# Patient Record
Sex: MALE | Race: OTHER | NOT HISPANIC OR LATINO | URBAN - METROPOLITAN AREA
[De-identification: names, ages, dates, MRNs, and addresses within clinical notes are randomized per-mention and may not be internally consistent; named-entity substitution may affect disease eponyms.]

---

## 2024-02-22 ENCOUNTER — EMERGENCY (EMERGENCY)
Facility: HOSPITAL | Age: 34
LOS: 1 days | Discharge: HOME | End: 2024-02-22
Attending: EMERGENCY MEDICINE | Admitting: EMERGENCY MEDICINE
Payer: SELF-PAY

## 2024-02-22 VITALS
TEMPERATURE: 98 F | RESPIRATION RATE: 16 BRPM | SYSTOLIC BLOOD PRESSURE: 145 MMHG | OXYGEN SATURATION: 97 % | DIASTOLIC BLOOD PRESSURE: 80 MMHG | HEART RATE: 120 BPM

## 2024-02-22 DIAGNOSIS — R00.0 TACHYCARDIA, UNSPECIFIED: ICD-10-CM

## 2024-02-22 DIAGNOSIS — F12.929 CANNABIS USE, UNSPECIFIED WITH INTOXICATION, UNSPECIFIED: ICD-10-CM

## 2024-02-22 DIAGNOSIS — R94.31 ABNORMAL ELECTROCARDIOGRAM [ECG] [EKG]: ICD-10-CM

## 2024-02-22 DIAGNOSIS — R41.82 ALTERED MENTAL STATUS, UNSPECIFIED: ICD-10-CM

## 2024-02-22 LAB
ALBUMIN SERPL ELPH-MCNC: 4 G/DL — SIGNIFICANT CHANGE UP (ref 3.4–5)
ALP SERPL-CCNC: 55 U/L — SIGNIFICANT CHANGE UP (ref 40–120)
ALT FLD-CCNC: 29 U/L — SIGNIFICANT CHANGE UP (ref 12–42)
ANION GAP SERPL CALC-SCNC: 13 MMOL/L — SIGNIFICANT CHANGE UP (ref 9–16)
AST SERPL-CCNC: 23 U/L — SIGNIFICANT CHANGE UP (ref 15–37)
BASOPHILS # BLD AUTO: 0.05 K/UL — SIGNIFICANT CHANGE UP (ref 0–0.2)
BASOPHILS NFR BLD AUTO: 0.4 % — SIGNIFICANT CHANGE UP (ref 0–2)
BILIRUB SERPL-MCNC: 0.4 MG/DL — SIGNIFICANT CHANGE UP (ref 0.2–1.2)
BUN SERPL-MCNC: 22 MG/DL — SIGNIFICANT CHANGE UP (ref 7–23)
CALCIUM SERPL-MCNC: 9.4 MG/DL — SIGNIFICANT CHANGE UP (ref 8.5–10.5)
CHLORIDE SERPL-SCNC: 102 MMOL/L — SIGNIFICANT CHANGE UP (ref 96–108)
CO2 SERPL-SCNC: 22 MMOL/L — SIGNIFICANT CHANGE UP (ref 22–31)
CREAT SERPL-MCNC: 1.26 MG/DL — SIGNIFICANT CHANGE UP (ref 0.5–1.3)
EGFR: 78 ML/MIN/1.73M2 — SIGNIFICANT CHANGE UP
EOSINOPHIL # BLD AUTO: 0.04 K/UL — SIGNIFICANT CHANGE UP (ref 0–0.5)
EOSINOPHIL NFR BLD AUTO: 0.3 % — SIGNIFICANT CHANGE UP (ref 0–6)
GLUCOSE SERPL-MCNC: 136 MG/DL — HIGH (ref 70–99)
HCT VFR BLD CALC: 44.2 % — SIGNIFICANT CHANGE UP (ref 39–50)
HGB BLD-MCNC: 15.2 G/DL — SIGNIFICANT CHANGE UP (ref 13–17)
IMM GRANULOCYTES NFR BLD AUTO: 0.4 % — SIGNIFICANT CHANGE UP (ref 0–0.9)
LACTATE BLDV-MCNC: 1.2 MMOL/L — SIGNIFICANT CHANGE UP (ref 0.5–2)
LYMPHOCYTES # BLD AUTO: 16.4 % — SIGNIFICANT CHANGE UP (ref 13–44)
LYMPHOCYTES # BLD AUTO: 2.26 K/UL — SIGNIFICANT CHANGE UP (ref 1–3.3)
MAGNESIUM SERPL-MCNC: 1.8 MG/DL — SIGNIFICANT CHANGE UP (ref 1.6–2.6)
MCHC RBC-ENTMCNC: 30.8 PG — SIGNIFICANT CHANGE UP (ref 27–34)
MCHC RBC-ENTMCNC: 34.4 GM/DL — SIGNIFICANT CHANGE UP (ref 32–36)
MCV RBC AUTO: 89.7 FL — SIGNIFICANT CHANGE UP (ref 80–100)
MONOCYTES # BLD AUTO: 1.17 K/UL — HIGH (ref 0–0.9)
MONOCYTES NFR BLD AUTO: 8.5 % — SIGNIFICANT CHANGE UP (ref 2–14)
NEUTROPHILS # BLD AUTO: 10.22 K/UL — HIGH (ref 1.8–7.4)
NEUTROPHILS NFR BLD AUTO: 74 % — SIGNIFICANT CHANGE UP (ref 43–77)
NRBC # BLD: 0 /100 WBCS — SIGNIFICANT CHANGE UP (ref 0–0)
PCO2 BLDV: 48 MMHG — SIGNIFICANT CHANGE UP (ref 42–55)
PH BLDV: 7.35 — SIGNIFICANT CHANGE UP (ref 7.32–7.43)
PLATELET # BLD AUTO: 338 K/UL — SIGNIFICANT CHANGE UP (ref 150–400)
PO2 BLDV: 55 MMHG — HIGH (ref 25–45)
POTASSIUM SERPL-MCNC: 3.6 MMOL/L — SIGNIFICANT CHANGE UP (ref 3.5–5.3)
POTASSIUM SERPL-SCNC: 3.6 MMOL/L — SIGNIFICANT CHANGE UP (ref 3.5–5.3)
PROT SERPL-MCNC: 7.5 G/DL — SIGNIFICANT CHANGE UP (ref 6.4–8.2)
RBC # BLD: 4.93 M/UL — SIGNIFICANT CHANGE UP (ref 4.2–5.8)
RBC # FLD: 12.8 % — SIGNIFICANT CHANGE UP (ref 10.3–14.5)
SAO2 % BLDV: 85.4 % — SIGNIFICANT CHANGE UP (ref 67–88)
SODIUM SERPL-SCNC: 137 MMOL/L — SIGNIFICANT CHANGE UP (ref 132–145)
WBC # BLD: 13.8 K/UL — HIGH (ref 3.8–10.5)
WBC # FLD AUTO: 13.8 K/UL — HIGH (ref 3.8–10.5)

## 2024-02-22 PROCEDURE — 99223 1ST HOSP IP/OBS HIGH 75: CPT

## 2024-02-22 RX ORDER — FAMOTIDINE 10 MG/ML
20 INJECTION INTRAVENOUS ONCE
Refills: 0 | Status: COMPLETED | OUTPATIENT
Start: 2024-02-22 | End: 2024-02-22

## 2024-02-22 RX ORDER — SODIUM CHLORIDE 9 MG/ML
1000 INJECTION INTRAMUSCULAR; INTRAVENOUS; SUBCUTANEOUS ONCE
Refills: 0 | Status: COMPLETED | OUTPATIENT
Start: 2024-02-22 | End: 2024-02-22

## 2024-02-22 RX ORDER — KETOROLAC TROMETHAMINE 30 MG/ML
15 SYRINGE (ML) INJECTION ONCE
Refills: 0 | Status: DISCONTINUED | OUTPATIENT
Start: 2024-02-22 | End: 2024-02-22

## 2024-02-22 RX ADMIN — FAMOTIDINE 20 MILLIGRAM(S): 10 INJECTION INTRAVENOUS at 21:04

## 2024-02-22 RX ADMIN — SODIUM CHLORIDE 1000 MILLILITER(S): 9 INJECTION INTRAMUSCULAR; INTRAVENOUS; SUBCUTANEOUS at 21:04

## 2024-02-22 RX ADMIN — Medication 15 MILLIGRAM(S): at 21:04

## 2024-02-22 RX ADMIN — Medication 1 MILLIGRAM(S): at 21:04

## 2024-02-22 NOTE — ED CDU PROVIDER INITIAL DAY NOTE - CLINICAL SUMMARY MEDICAL DECISION MAKING FREE TEXT BOX
no e/o trauma, Tachycardia common in cannabis intox, no e/o dangerous arrhythmia or ACS. Trending down. Antiemetics held for long QT. Pending clinical sobriety, anticipate d/c home.

## 2024-02-22 NOTE — ED ADULT NURSE NOTE - NSSUHOSCREENINGYN_ED_ALL_ED
TRANSITIONAL CARE MANAGEMENT - HOSPITAL DISCHARGE FOLLOW-UP    Contacted Mr. Cordova regarding follow-up for Chest pain, Chest pain, unspecified type, Chest pain after hospital discharge. He was discharged from the hospital on 4/30/2020. Review of the After Visit Summary from the recent hospitalization indicates that the patient needs to follow up with Dr. Huynh in 1 week.    He feels that he is doing very well at home.   His diet concern is none. Overall, the patient is eating well.   Ambulation: resolved  Fever: is not present  Pain: none  Activities of Daily Living (global): Self-care   Patient states that he does have sufficient family support. He feels that he is able to ask for assistance when needed.     Additional patient/family concerns: None .    Discharge medications were verified with the patient. He is fully compliant with the medication regimen prescribed at the time of discharge.    Upon discharge, the patient was to receive none.   Advance Directives:  not discussed    Patient has an appointment on May 8, 2020 with NDAEGE Maya. Mr. Cordova was reminded about the importance of keeping this appointment.      No - the patient is unable to be screened due to medical condition

## 2024-02-22 NOTE — ED CDU PROVIDER INITIAL DAY NOTE - OBJECTIVE STATEMENT
Pt BIB by EMS for cannabis intoxication.  Admits to eating a cannabis lollipop today, no other complaints.  Placed in stretcher and quickly falls asleep.  Unable to cooperate with remainder of history.

## 2024-02-22 NOTE — ED ADULT NURSE NOTE - OBJECTIVE STATEMENT
Patient is a 32 y/o M c/o altered mental status. Patient bibems for ams. patient admits to taking thc lolipop. Patient reports visiting from [place]. Patient denies falls or head trauma. Patient denies any other drug use. Patient actively vomitting upon assessment. Unable to give medications at this time due to previous QT prolongation on ekg prior. Patient placed on continuous cardiac monitor and pulse. Safety intact- all needs met at this time

## 2024-02-22 NOTE — ED PROVIDER NOTE - CLINICAL SUMMARY MEDICAL DECISION MAKING FREE TEXT BOX
no e/o trauma, Tachycardia common in cannabis intox, no e/o dangerous arrhythmia or ACS. Trending down. Pending clinical sobriety, anticipate d/c home. no e/o trauma, Tachycardia common in cannabis intox, no e/o dangerous arrhythmia or ACS. Trending down. Antiemetics held for long QT. Pending clinical sobriety, anticipate d/c home.

## 2024-02-22 NOTE — ED PROVIDER NOTE - PROGRESS NOTE DETAILS
Latasha–patient was endorsed to me pending improved mental status will place in observation and continue to monitor repeat EKG for prolongation of QTc and check basic electrolytes

## 2024-02-22 NOTE — ED ADULT TRIAGE NOTE - CHIEF COMPLAINT QUOTE
BIBEMS from [facility]. Pt endorses eating thc lollipop today. Pt nauseated on arrival. Denies any medical hx

## 2024-02-22 NOTE — ED PROVIDER NOTE - NSFOLLOWUPINSTRUCTIONS_ED_ALL_ED_FT
Preventing Marijuana Misuse, Adult  Marijuana is a mixture of the dried leaves and flowers of the hemp plant Cannabis sativa. The plant's active ingredients (cannabinoids) change the chemistry of the brain. If you smoke or eat marijuana, you will have changes in the way you think, feel, and behave.    In some cases, marijuana is prescribed by a health care provider (medical marijuana) for temporary relief of a medical condition. It can have medical effects, such as pain relief or reduced nausea. Many people use marijuana for recreational purposes because it helps them relax and puts them in a good mood (marijuana high).    How can marijuana use affect me?  Effects of marijuana are mental and physical. It can have negative effects, both short-term and long-term. When used for recreation, marijuana is often used in higher doses and for longer periods. High doses of marijuana can cause unpleasant side effects. It is also possible to become addicted to marijuana.    Short-term effects of marijuana use include:  Physical effects:  Increased heart rate.  Slowed movement, coordination, and reaction time. This can lead to accidents.  Red eyes and changes in vision.  Increased hunger.  Coughing.  Mental effects:  Changes in mood and perception, such as an altered sense of time.  Poor memory, decision-making, and problem-solving.  Being very sleepy.  High doses of marijuana can cause:  Panic.  Anxiety.  Confusion.  Severe dizziness.  Vomiting.  Hallucinations. This means you see, hear, taste, smell, or feel things that are not real.  What can increase my risk?  You may be at a higher risk of marijuana misuse if you:  Have a family history of drug addiction.  Often use other substances, like alcohol.  Began using marijuana at an early age.  Have had past issues with substance use disorder.  What can happen if I keep using marijuana?  If you use marijuana for a long time, it can have long-term effects such as:  Higher risk of health problems, including:  Lung and breathing problems.  Possible higher risk of heart and blood vessel problems.  Possible higher risk of lung or testicular cancer.  A decrease in the body's disease-fighting system (immune system).  Mental and physical dependence (addiction).  Hallucinations or periods of false perceptions or beliefs (paranoia).  Worsening of mental illness or onset of new mental illness. This can include anxiety, depression, or suicidal thoughts.  Trouble maintaining healthy relationships.  Poor memory and trouble concentrating and learning. This can result in lower intelligence and poor performance at school or work.  Higher risk of using other substances like alcohol, nicotine, and illegal drugs.  A condition called cannabinoid hyperemesis syndrome. This causes repeated episodes of intense abdominal pain, nausea, and vomiting.  Quitting marijuana after using it for a long time can cause withdrawal symptoms, such as headache, shakiness, and cravings for the drug.    What actions can I take to stop using marijuana?  A group of people participating in a support group.  If you are not physically or mentally dependent on marijuana, you should be able to stop using it on your own. Taking these actions may help:  Find healthy ways to cope with stress, such as exercise, meditation, or spending time with family and friends. Talk with your health care provider about how you feel and how to cope with stress.  Spend time with people who do not use marijuana. Or, make new friends who do not use marijuana.  Do something else instead of using marijuana. Exercise, start a new hobby, or take part in activities with others.  Do not be afraid to say no if someone offers you marijuana. Speak up about why you do not want to use drugs. You can be a positive role model for others.  If you cannot stop on your own, ask your health care provider for help. Treatment for marijuana misuse may include:  Cognitive-behavioral therapy (psychotherapy). This may include individual or group therapy.  Joining a support group.  Treating other medical, behavioral, or mental health conditions that you may have.  What are the effects of vaping?  Vaping devices, such as e-cigarettes, may be used to smoke marijuana products. Vaping involves inhaling an aerosol or vapor made from a liquid or dry substance that is heated in the device.    People who vape may also use marijuana concentrates. Vaping these products can result in more side effects than the use of plant marijuana, such as:  Inhaling toxic substances, including metals and volatile organic compounds (VOCs) from the devices and solvents used.  Inhaling heated air. This has been shown to burn lung tissue and cause lung diseases and lung cancer.  Increased risk of mental illness, such as psychosis and schizophrenia.  Where to find more information  Centers for Disease Control and Prevention (CDC): cdc.gov  National Institutes of Health: nccih.nih.gov  Substance Abuse and Mental Health Services Administration: samhsa.gov  Contact a health care provider if:  You want to stop using marijuana but you cannot.  You have withdrawal symptoms when you try to stop using marijuana.  You are using marijuana every day or with other drugs.  You need to use more and more marijuana to get the same desired effect.  You have anxiety or depression.  You have hallucinations or paranoia.  Marijuana use is interfering with your relationships or your ability to function at school or work.  You have severe stomach pain, nausea, and vomiting.  Get help right away if:  You have chest pain or shortness of breath.  You have fast or irregular heartbeats (palpitations).  These symptoms may be an emergency. Get help right away. Call 911.  Do not wait to see if the symptoms will go away.  Do not drive yourself to the hospital.  This information is not intended to replace advice given to you by your health care provider. Make sure you discuss any questions you have with your health care provider.

## 2024-02-22 NOTE — ED CDU PROVIDER INITIAL DAY NOTE - PROGRESS NOTE DETAILS
Patient is now awake coherent alert and oriented x 3 he is currently traveling and staying at a hotel in [place] he is ambulatory to the restroom with no issues has no acute medical complaints.  All labs were discussed and patient is stable for discharge

## 2024-02-23 VITALS
OXYGEN SATURATION: 99 % | SYSTOLIC BLOOD PRESSURE: 136 MMHG | HEART RATE: 78 BPM | RESPIRATION RATE: 16 BRPM | TEMPERATURE: 98 F | DIASTOLIC BLOOD PRESSURE: 78 MMHG

## 2024-02-23 NOTE — ED CDU PROVIDER DISPOSITION NOTE - CLINICAL COURSE
Patient seen and evaluated for altered mental status normal labs placed in observation for improving sobriety and mental status.  Clinically stable at the time of discharge

## 2024-02-23 NOTE — ED CDU PROVIDER DISPOSITION NOTE - PATIENT PORTAL LINK FT
You can access the FollowMyHealth Patient Portal offered by [facility] by registering at the following website: [email or web address]. By joining [employer]’s FollowMyHealth portal, you will also be able to view your health information using other applications (apps) compatible with our system.

## 2024-02-23 NOTE — ED ADULT NURSE REASSESSMENT NOTE - NS ED NURSE REASSESS COMMENT FT1
Pt. received resting in stretcher with eyes closed easily arousable to voice. Pt. is breathing at ease on room air with visible chest rise. Pt. is on continuous cardiac monitor. 20g to RAC no redness, swelling, or tenderness noted. Fall precautions in place, stretcher alarm on and hourly rounding implemented. Awaiting sobriety for discharge.